# Patient Record
Sex: MALE | Race: WHITE | URBAN - METROPOLITAN AREA
[De-identification: names, ages, dates, MRNs, and addresses within clinical notes are randomized per-mention and may not be internally consistent; named-entity substitution may affect disease eponyms.]

---

## 2024-02-21 ENCOUNTER — OFFICE VISIT (OUTPATIENT)
Dept: URGENT CARE | Facility: CLINIC | Age: 54
End: 2024-02-21
Payer: COMMERCIAL

## 2024-02-21 VITALS
TEMPERATURE: 97.2 F | DIASTOLIC BLOOD PRESSURE: 81 MMHG | HEIGHT: 69 IN | OXYGEN SATURATION: 95 % | SYSTOLIC BLOOD PRESSURE: 129 MMHG | BODY MASS INDEX: 38.09 KG/M2 | RESPIRATION RATE: 16 BRPM | WEIGHT: 257.2 LBS | HEART RATE: 48 BPM

## 2024-02-21 DIAGNOSIS — B34.9 VIRAL SYNDROME: Primary | ICD-10-CM

## 2024-02-21 PROCEDURE — 99213 OFFICE O/P EST LOW 20 MIN: CPT | Performed by: NURSE PRACTITIONER

## 2024-02-21 RX ORDER — SPIRONOLACTONE 25 MG/1
25 TABLET ORAL DAILY
COMMUNITY
Start: 2023-12-12 | End: 2024-12-11

## 2024-02-21 RX ORDER — ATORVASTATIN CALCIUM 80 MG/1
1 TABLET, FILM COATED ORAL EVERY EVENING
COMMUNITY
Start: 2023-10-02

## 2024-02-21 RX ORDER — METOPROLOL SUCCINATE 100 MG/1
200 TABLET, EXTENDED RELEASE ORAL DAILY
COMMUNITY
Start: 2023-12-18

## 2024-02-21 RX ORDER — ASPIRIN 81 MG/1
81 TABLET ORAL DAILY
COMMUNITY

## 2024-02-21 NOTE — LETTER
February 21, 2024     Patient: Jaskaran Henderson   YOB: 1970   Date of Visit: 2/21/2024       To Whom it May Concern:    Jaskaran Henderson was seen in my clinic on 2/21/2024. He may return to work on 2/22/2024 .    If you have any questions or concerns, please don't hesitate to call.         Sincerely,          GILSON Rowell

## 2024-02-21 NOTE — PROGRESS NOTES
[facility]        NAME: Jaskaran Henderson is a 54 y.o. male  : 1970    MRN: [id number]  DATE: 2024  TIME: 12:45 PM    Assessment and Plan   Viral syndrome [B34.9]  1. Viral syndrome          Acute symptomatic symptoms resolved at this time will provide note for work follow-up with any worsening symptoms   Patient Instructions       Follow up with PCP in 3-5 days.  Proceed to  ER if symptoms worsen.    Chief Complaint     Chief Complaint   Patient presents with   • Cold Like Symptoms     Weakness, headache, and fatigued starting this AM. OTC meds - tylenol. Symptoms have resolved at this time.          History of Present Illness       Patient is a 54-year-old male arrives with starting today fatigue headache body aches cough started this morning.  Took some over-the-counter Tylenol with some relief.  Symptoms have resolved at this time needing a work note for today.        Review of Systems   Review of Systems   Constitutional:  Negative for activity change, appetite change, chills, fatigue and fever.   HENT:  Negative for congestion, rhinorrhea, sinus pressure, sinus pain and sore throat.    Respiratory:  Positive for cough. Negative for chest tightness and shortness of breath.    Gastrointestinal:  Negative for constipation, diarrhea, nausea and vomiting.   Musculoskeletal:  Positive for myalgias. Negative for joint swelling.   Skin:  Negative for color change, pallor and rash.   Neurological:  Positive for headaches. Negative for dizziness, syncope, weakness and light-headedness.   Hematological:  Negative for adenopathy.   Psychiatric/Behavioral:  Negative for agitation and confusion.          Current Medications       Current Outpatient Medications:   •  Acetaminophen 325 MG CAPS, Take by mouth, Disp: , Rfl:   •  aspirin (ECOTRIN LOW STRENGTH) 81 mg EC tablet, Take 81 mg by mouth daily, Disp: , Rfl:   •  atorvastatin (LIPITOR) 80 mg tablet, Take 1 tablet by mouth every evening, Disp: ,  "Rfl:   •  metoprolol succinate (TOPROL-XL) 100 mg 24 hr tablet, Take 200 mg by mouth daily, Disp: , Rfl:   •  sacubitril-valsartan (ENTRESTO)  MG TABS, Take 1 tablet by mouth, Disp: , Rfl:   •  spironolactone (ALDACTONE) 25 mg tablet, Take 25 mg by mouth daily, Disp: , Rfl:     Current Allergies     Allergies as of 02/21/2024   • (No Known Allergies)            The following portions of the patient's history were reviewed and updated as appropriate: allergies, current medications, past family history, past medical history, past social history, past surgical history and problem list.     Past Medical History:   Diagnosis Date   • High cholesterol    • Hypertension        Past Surgical History:   Procedure Laterality Date   • CORONARY ARTERY BYPASS GRAFT         History reviewed. No pertinent family history.      Medications have been verified.        Objective   /81   Pulse (!) 48   Temp (!) 97.2 °F (36.2 °C)   Resp 16   Ht 5' 9\" (1.753 m)   Wt 117 kg (257 lb 3.2 oz)   SpO2 95%   BMI 37.98 kg/m²   No LMP for male patient.       Physical Exam     Physical Exam  Vitals and nursing note reviewed.   Constitutional:       General: He is not in acute distress.     Appearance: Normal appearance. He is not ill-appearing, toxic-appearing or diaphoretic.   HENT:      Head: Normocephalic and atraumatic.      Right Ear: Tympanic membrane, ear canal and external ear normal. There is no impacted cerumen.      Left Ear: Tympanic membrane, ear canal and external ear normal. There is no impacted cerumen.      Nose: No congestion or rhinorrhea.      Mouth/Throat:      Mouth: Mucous membranes are moist.      Pharynx: No posterior oropharyngeal erythema.   Eyes:      General: No scleral icterus.        Right eye: No discharge.         Left eye: No discharge.      Conjunctiva/sclera: Conjunctivae normal.   Cardiovascular:      Rate and Rhythm: Normal rate and regular rhythm.   Pulmonary:      Effort: Pulmonary effort " is normal. No respiratory distress.      Breath sounds: Normal breath sounds. No stridor. No wheezing, rhonchi or rales.   Musculoskeletal:         General: Normal range of motion.      Cervical back: Normal range of motion.   Lymphadenopathy:      Cervical: No cervical adenopathy.   Skin:     General: Skin is dry.   Neurological:      Mental Status: He is alert and oriented to person, place, and time.   Psychiatric:         Mood and Affect: Mood normal.         Behavior: Behavior normal.         Thought Content: Thought content normal.         Judgment: Judgment normal.

## 2024-05-01 ENCOUNTER — OFFICE VISIT (OUTPATIENT)
Dept: URGENT CARE | Facility: CLINIC | Age: 54
End: 2024-05-01
Payer: COMMERCIAL

## 2024-05-01 VITALS
TEMPERATURE: 97.2 F | OXYGEN SATURATION: 98 % | HEIGHT: 69 IN | HEART RATE: 49 BPM | BODY MASS INDEX: 36.88 KG/M2 | WEIGHT: 249 LBS | SYSTOLIC BLOOD PRESSURE: 116 MMHG | DIASTOLIC BLOOD PRESSURE: 66 MMHG | RESPIRATION RATE: 14 BRPM

## 2024-05-01 DIAGNOSIS — J30.1 ALLERGIC RHINITIS DUE TO POLLEN, UNSPECIFIED SEASONALITY: ICD-10-CM

## 2024-05-01 DIAGNOSIS — J06.9 ACUTE URI: Primary | ICD-10-CM

## 2024-05-01 PROCEDURE — 99213 OFFICE O/P EST LOW 20 MIN: CPT | Performed by: PHYSICIAN ASSISTANT

## 2024-05-01 RX ORDER — PREDNISONE 10 MG/1
TABLET ORAL
Qty: 21 TABLET | Refills: 0 | Status: SHIPPED | OUTPATIENT
Start: 2024-05-01

## 2024-05-01 RX ORDER — NITROGLYCERIN 0.4 MG/1
0.4 TABLET SUBLINGUAL
COMMUNITY
Start: 2024-03-26 | End: 2024-07-24

## 2024-05-01 NOTE — PATIENT INSTRUCTIONS
Upper Respiratory Infection   WHAT YOU NEED TO KNOW:   An upper respiratory infection is also called a cold. It can affect your nose, throat, ears, and sinuses. Cold symptoms are usually worst for the first 3 to 5 days. Most people get better in 7 to 14 days. You may continue to cough for 2 to 3 weeks. Colds are caused by viruses and do not get better with antibiotics.  DISCHARGE INSTRUCTIONS:   Call your local emergency number (911 in the [place]) if:   You have chest pain or trouble breathing.      Return to the emergency department if:   You have a fever over 102ºF (39ºC).      Call your doctor if:   You have a low fever.    Your sore throat gets worse or you see white or yellow spots in your throat.    Your symptoms get worse after 3 to 5 days or are not better in 14 days.    You have a rash anywhere on your skin.    You have large, tender lumps in your neck.    You have thick, green, or yellow drainage from your nose.    You cough up thick yellow, green, or bloody mucus.    You have a bad earache.    You have questions or concerns about your condition or care.    Medicines:  You may need any of the following:  Decongestants  help reduce nasal congestion and help you breathe more easily. If you take decongestant pills, they may make you feel restless or cause problems with your sleep. Do not use decongestant sprays for more than a few days.    Cough suppressants  help reduce coughing. Ask your healthcare provider which type of cough medicine is best for you.     NSAIDs , such as ibuprofen, help decrease swelling, pain, and fever. NSAIDs can cause stomach bleeding or kidney problems in certain people. If you take blood thinner medicine, always ask your healthcare provider if NSAIDs are safe for you. Always read the medicine label and follow directions.    Acetaminophen  decreases pain and fever. It is available without a doctor's order. Ask how much to take and how often to take it. Follow directions. Read the labels  of all other medicines you are using to see if they also contain acetaminophen, or ask your doctor or pharmacist. Acetaminophen can cause liver damage if not taken correctly.    Take your medicine as directed.  Contact your healthcare provider if you think your medicine is not helping or if you have side effects. Tell your provider if you are allergic to any medicine. Keep a list of the medicines, vitamins, and herbs you take. Include the amounts, and when and why you take them. Bring the list or the pill bottles to follow-up visits. Carry your medicine list with you in case of an emergency.    Self-care:   Rest as much as possible.  Slowly start to do more each day.    Drink more liquids as directed.  Liquids will help thin and loosen mucus so you can cough it up. Liquids will also help prevent dehydration. Liquids that help prevent dehydration include water, fruit juice, and broth. Do not drink liquids that contain caffeine. Caffeine can increase your risk for dehydration. Ask your healthcare provider how much liquid to drink each day.    Soothe a sore throat.  Gargle with warm salt water. Make salt water by dissolving ¼ teaspoon salt in 1 cup warm water. You may also suck on hard candy or throat lozenges. You may use a sore throat spray.    Use a humidifier or vaporizer.  Use a cool mist humidifier or a vaporizer to increase air moisture in your home. This may make it easier for you to breathe and help decrease your cough.    Use saline nasal drops as directed.  These help relieve congestion.    Apply petroleum-based jelly around the outside of your nostrils.  This can decrease irritation from blowing your nose.    Do not smoke.  Nicotine and other chemicals in cigarettes and cigars can make your symptoms worse. They can also cause infections such as bronchitis or pneumonia. Ask your healthcare provider for information if you currently smoke and need help to quit. E-cigarettes or smokeless tobacco still contain  nicotine. Talk to your healthcare provider before you use these products.    Prevent a cold:   Wash your hands often.  Use soap and water every time you wash your hands. Rub your soapy hands together, lacing your fingers. Use the fingers of one hand to scrub under the nails of the other hand. Wash for at least 20 seconds. Rinse with warm, running water for several seconds. Then dry your hands. Use hand  gel if soap and water are not available. Do not touch your eyes or mouth without washing your hands first.         Cover a sneeze or cough.  Use a tissue that covers your mouth and nose. Put the used tissue in the trash right away. Use the bend of your arm if a tissue is not available. Wash your hands well with soap and water or use a hand . Do not stand close to anyone who is sneezing or coughing.    Try to stay away from others while you are sick.  This is especially important during the first 2 to 3 days when the virus is more easily spread. Wait until a fever, cough, or other symptoms are gone before you return to work or other regular activities.    Do not share items while you are sick.  This includes food, drinks, eating utensils, and dishes.    Follow up with your doctor as directed:  Write down your questions so you remember to ask them during your visits.  © Copyright [employer] 2023 Information is for End User's use only and may not be sold, redistributed or otherwise used for commercial purposes.  The above information is an  only. It is not intended as medical advice for individual conditions or treatments. Talk to your doctor, nurse or pharmacist before following any medical regimen to see if it is safe and effective for you.      Allergic Rhinitis   WHAT YOU NEED TO KNOW:   Allergic rhinitis, or hay fever, is swelling of the inside of your nose. The swelling is a reaction to allergens in the air. An allergen can be anything that causes an allergic reaction. Allergies to  weeds, grass, trees, or mold often cause seasonal allergic rhinitis. Indoor dust mites, cockroaches, pet dander, or mold can also cause allergic rhinitis.   DISCHARGE INSTRUCTIONS:   Call 911 for the following:   You have chest pain or shortness of breath.       Return to the emergency department if:   You have severe pain.    You cough up blood.    Contact your healthcare provider if:   You have a fever.     You have ear or sinus pain, or a headache.    Your symptoms get worse, even after treatment.     You have yellow, green, brown, or bloody mucus coming from your nose.     Your nose is bleeding or you have pain inside your nose.     You have trouble sleeping because of your symptoms.    You have questions or concerns about your condition or care.    Medicines:   Medicines  help decrease your symptoms and clear your stuffy nose.    Take your medicine as directed.  Contact your healthcare provider if you think your medicine is not helping or if you have side effects. Tell him of her if you are allergic to any medicine. Keep a list of the medicines, vitamins, and herbs you take. Include the amounts, and when and why you take them. Bring the list or the pill bottles to follow-up visits. Carry your medicine list with you in case of an emergency.    How to manage allergic rhinitis:  The best way to manage allergic rhinitis is to avoid allergens that can trigger your symptoms. Any of the following may help decrease your symptoms:  Rinse your nose and sinuses  with a salt water solution or use a salt water nasal spray. This will help thin the mucus in your nose and rinse away pollen and dirt. It will also help reduce swelling so you can breathe normally. Ask your healthcare provider how often to rinse your nose.    Reduce exposure to dust mites.  Wash sheets and towels in hot water every week. Cover your pillows and mattresses with allergen-free covers. Limit the number of stuffed animals and soft toys your child has.  Wash your child's toys in hot water regularly. Vacuum weekly and use a vacuum  with an air filter. If possible, get rid of carpets and curtains. These collect dust and dust mites.     Reduce exposure to pollen.  Keep windows and doors closed in your house and car. Stay inside when air pollution or the pollen count is high. Run your air conditioner on recycle, and change air filters often. Shower and wash your hair before bed every night to rinse away pollen.    Reduce exposure to pet dander.  If possible, do not keep cats, dogs, birds, or other pets. If you do keep pets in your home, keep them out of bedrooms and carpeted rooms. Bathe them often.     Reduce exposure to mold.  Do not spend time in basements. Choose artificial plants instead of live plants. Keep your home's humidity at less than 45%. Do not have ponds or standing water in your home or yard.     Do not smoke.  Avoid others who smoke. Ask your healthcare provider for information if you currently smoke and need help to quit.    Follow up with your healthcare provider as directed:  You may need to see an allergist often to control your symptoms. Write down your questions so you remember to ask them during your visits.  © Copyright [employer] 2022 Information is for End User's use only and may not be sold, redistributed or otherwise used for commercial purposes. All illustrations and images included in CareNotes® are the copyrighted property of [employer] or [employer]  The above information is an  only. It is not intended as medical advice for individual conditions or treatments. Talk to your doctor, nurse or pharmacist before following any medical regimen to see if it is safe and effective for you.

## 2024-05-01 NOTE — LETTER
May 1, 2024     Patient: Jaskaran Henderson   YOB: 1970   Date of Visit: 5/1/2024       To Whom it May Concern:    Jaskaran Henderson was seen in my clinic on 5/1/2024. He may return to work on 5/2/2024 .    If you have any questions or concerns, please don't hesitate to call.         Sincerely,          Thony Biggs PA-C        CC: No Recipients

## 2024-05-01 NOTE — PROGRESS NOTES
[facility]        NAME: Jaskaran Henderson is a 54 y.o. male  : 1970    MRN: [id number]  DATE: May 1, 2024  TIME: 9:09 AM    Assessment and Plan   Acute URI [J06.9]  1. Acute URI  predniSONE 10 mg tablet      2. Allergic rhinitis due to pollen, unspecified seasonality  predniSONE 10 mg tablet            Patient Instructions     Pt has acute onset of sinus congestion likely from an acute URI vs seasonal allergies. I prescribed him an oral Prednisone taper and discussed OTC symptomatic/supportive measures.   Follow up with PCP in 3-5 days.  Proceed to  ER if symptoms worsen.    If tests have been performed at [facility], our office will contact you with results if changes need to be made to the care plan discussed with you at the visit.  [facility]    Chief Complaint     Chief Complaint   Patient presents with    Cold Like Symptoms     Sinus congestion starting this am.          History of Present Illness       Pt presents with onset this AM of sinus congestion as primary symptom. He denies ST, cough, fever, chills, NVD. He has hx of allergies. Not taking anything significantly to this point for the symptoms.         Review of Systems   Review of Systems   Constitutional: Negative.    HENT:  Positive for congestion. Negative for sore throat.    Respiratory: Negative.     Cardiovascular: Negative.    Gastrointestinal: Negative.    Genitourinary: Negative.          Current Medications       Current Outpatient Medications:     Acetaminophen 325 MG CAPS, Take by mouth, Disp: , Rfl:     aspirin (ECOTRIN LOW STRENGTH) 81 mg EC tablet, Take 81 mg by mouth daily, Disp: , Rfl:     atorvastatin (LIPITOR) 80 mg tablet, Take 1 tablet by mouth every evening, Disp: , Rfl:     metoprolol succinate (TOPROL-XL) 100 mg 24 hr tablet, Take 200 mg by mouth daily, Disp: , Rfl:     predniSONE 10 mg tablet, 6-5-4-3-2-1 taper with food., Disp: 21 tablet, Rfl: 0    sacubitril-valsartan  "(ENTRESTO)  MG TABS, Take 1 tablet by mouth, Disp: , Rfl:     spironolactone (ALDACTONE) 25 mg tablet, Take 25 mg by mouth daily, Disp: , Rfl:     nitroglycerin (NITROSTAT) 0.4 mg SL tablet, Place 0.4 mg under the tongue (Patient not taking: Reported on 5/1/2024), Disp: , Rfl:     Current Allergies     Allergies as of 05/01/2024    (No Known Allergies)            The following portions of the patient's history were reviewed and updated as appropriate: allergies, current medications, past family history, past medical history, past social history, past surgical history and problem list.     Past Medical History:   Diagnosis Date    High cholesterol     Hypertension        Past Surgical History:   Procedure Laterality Date    CORONARY ARTERY BYPASS GRAFT         Family History   Problem Relation Age of Onset    Heart disease Father          Medications have been verified.        Objective   /66   Pulse (!) 49   Temp (!) 97.2 °F (36.2 °C)   Resp 14   Ht 5' 9\" (1.753 m)   Wt 113 kg (249 lb)   SpO2 98%   BMI 36.77 kg/m²   No LMP for male patient.       Physical Exam     Physical Exam  Vitals reviewed.   Constitutional:       General: He is not in acute distress.     Appearance: He is well-developed.   HENT:      Right Ear: Tympanic membrane, ear canal and external ear normal.      Left Ear: Tympanic membrane, ear canal and external ear normal.      Nose: Mucosal edema (B/L boggy turbinates) and congestion present.      Mouth/Throat:      Mouth: Mucous membranes are moist.      Pharynx: Oropharynx is clear.   Cardiovascular:      Rate and Rhythm: Normal rate and regular rhythm.      Heart sounds: Normal heart sounds. No murmur heard.  Pulmonary:      Effort: Pulmonary effort is normal. No respiratory distress.      Breath sounds: Normal breath sounds.   Musculoskeletal:      Cervical back: Neck supple.   Lymphadenopathy:      Cervical: No cervical adenopathy.   Neurological:      Mental Status: He is " alert and oriented to person, place, and time.

## 2025-02-17 ENCOUNTER — OFFICE VISIT (OUTPATIENT)
Dept: URGENT CARE | Facility: CLINIC | Age: 55
End: 2025-02-17
Payer: COMMERCIAL

## 2025-02-17 VITALS
RESPIRATION RATE: 12 BRPM | WEIGHT: 259.2 LBS | DIASTOLIC BLOOD PRESSURE: 85 MMHG | HEART RATE: 60 BPM | OXYGEN SATURATION: 99 % | BODY MASS INDEX: 38.39 KG/M2 | HEIGHT: 69 IN | SYSTOLIC BLOOD PRESSURE: 135 MMHG | TEMPERATURE: 96.1 F

## 2025-02-17 DIAGNOSIS — J06.9 VIRAL URI WITH COUGH: Primary | ICD-10-CM

## 2025-02-17 PROCEDURE — G0381 LEV 2 HOSP TYPE B ED VISIT: HCPCS

## 2025-02-17 PROCEDURE — S9083 URGENT CARE CENTER GLOBAL: HCPCS

## 2025-02-17 RX ORDER — BENZONATATE 200 MG/1
200 CAPSULE ORAL 3 TIMES DAILY PRN
Qty: 20 CAPSULE | Refills: 0 | Status: SHIPPED | OUTPATIENT
Start: 2025-02-17

## 2025-02-17 RX ORDER — AMOXICILLIN 500 MG/1
CAPSULE ORAL
COMMUNITY
Start: 2024-11-14

## 2025-02-17 NOTE — PATIENT INSTRUCTIONS
Thank you for trusting your health with [facility].    Please review the following instructions and return to our clinic or consider an Emergency Department visit for worsening or severe symptoms.      Instructions:   Utilize saline nasal spray OTC STRAIGHT down each nostril as often as needed  Utilize Flonase nasal spray with steroids OTC ANGLED towards your sinuses 2 times a day as needed  Maintain Tylenol every 6 hours as needed, do not exceed six, 500 mg doses in a 24 hour time period for fever, aches, and chills  Mucinex, blue box, consistently as written on the box can help break up mucus well  Hydrate, hydrate, hydrate  If symptoms worsen or do not start resolving with the week, please contact PCP or consider another visit with our team

## 2025-02-17 NOTE — LETTER
February 17, 2025     Patient: Jaskaran Henderson   YOB: 1970   Date of Visit: 2/17/2025       To Whom it May Concern:    Jaskaran Henderson was seen in my clinic on 2/17/2025. He may return to work on 2/18/25 .    If you have any questions or concerns, please don't hesitate to call.         Sincerely,          Sasha Dominguez PA-C        CC: No Recipients

## 2025-02-17 NOTE — PROGRESS NOTES
"Name: Jaskaran Henderson      : 1970      MRN: [id number]  Encounter Provider: Sasha Dominguez PA-C  Encounter Date: 2025   Encounter department: [facility]  :  Assessment & Plan  Viral URI with cough    Orders:    benzonatate (TESSALON) 200 MG capsule; Take 1 capsule (200 mg total) by mouth 3 (three) times a day as needed for cough    Tessalon pearls sent for bothersome cough from viral URI. Reviewed OTC mucus management as below:  Instructions:   Utilize saline nasal spray OTC STRAIGHT down each nostril as often as needed  Utilize Flonase nasal spray with steroids OTC ANGLED towards your sinuses 2 times a day as needed  Maintain Tylenol every 6 hours as needed, do not exceed six, 500 mg doses in a 24 hour time period for fever, aches, and chills  Mucinex, blue box, consistently as written on the box can help break up mucus well  Hydrate, hydrate, hydrate  If symptoms worsen or do not start resolving with the week, please contact PCP or consider another visit with our team    Pt knows return precautions.      History of Present Illness   HPI  Jaskaran Henderson is a 55 y.o. male who presents with cough and headache starting this am       Review of Systems   Constitutional:  Negative for fever.   HENT:  Positive for congestion.    Respiratory:  Positive for cough.    Neurological:  Positive for headaches.              Objective   /85   Pulse 60   Temp (!) 96.1 °F (35.6 °C)   Resp 12   Ht 5' 9\" (1.753 m)   Wt 118 kg (259 lb 3.2 oz)   SpO2 99%   BMI 38.28 kg/m²      Physical Exam  Constitutional:       Appearance: Normal appearance.   HENT:      Head: Normocephalic and atraumatic.      Right Ear: Tympanic membrane and ear canal normal.      Left Ear: Tympanic membrane and ear canal normal.      Nose: Congestion present.      Mouth/Throat:      Mouth: Mucous membranes are moist.   Cardiovascular:      Rate and Rhythm: Normal rate.   Pulmonary:      Effort: Pulmonary effort is normal. "      Breath sounds: Normal breath sounds.   Neurological:      Mental Status: He is alert.

## 2025-03-28 ENCOUNTER — OFFICE VISIT (OUTPATIENT)
Dept: URGENT CARE | Facility: CLINIC | Age: 55
End: 2025-03-28
Payer: COMMERCIAL

## 2025-03-28 VITALS
HEART RATE: 80 BPM | BODY MASS INDEX: 37.07 KG/M2 | SYSTOLIC BLOOD PRESSURE: 114 MMHG | WEIGHT: 251 LBS | OXYGEN SATURATION: 98 % | TEMPERATURE: 99.1 F | RESPIRATION RATE: 18 BRPM | DIASTOLIC BLOOD PRESSURE: 59 MMHG

## 2025-03-28 DIAGNOSIS — B34.9 VIRAL SYNDROME: ICD-10-CM

## 2025-03-28 DIAGNOSIS — M79.10 MYALGIA: Primary | ICD-10-CM

## 2025-03-28 LAB
GLUCOSE SERPL-MCNC: 129 MG/DL (ref 65–140)
SL AMB  POCT GLUCOSE, UA: ABNORMAL
SL AMB LEUKOCYTE ESTERASE,UA: ABNORMAL
SL AMB POCT BILIRUBIN,UA: ABNORMAL
SL AMB POCT BLOOD,UA: ABNORMAL
SL AMB POCT CLARITY,UA: ABNORMAL
SL AMB POCT COLOR,UA: YELLOW
SL AMB POCT KETONES,UA: ABNORMAL
SL AMB POCT NITRITE,UA: ABNORMAL
SL AMB POCT PH,UA: 5
SL AMB POCT SPECIFIC GRAVITY,UA: 1.01
SL AMB POCT URINE PROTEIN: 30
SL AMB POCT UROBILINOGEN: 0.2

## 2025-03-28 PROCEDURE — G0382 LEV 3 HOSP TYPE B ED VISIT: HCPCS | Performed by: PHYSICIAN ASSISTANT

## 2025-03-28 PROCEDURE — 81002 URINALYSIS NONAUTO W/O SCOPE: CPT | Performed by: PHYSICIAN ASSISTANT

## 2025-03-28 PROCEDURE — 82948 REAGENT STRIP/BLOOD GLUCOSE: CPT | Performed by: PHYSICIAN ASSISTANT

## 2025-03-28 PROCEDURE — 87636 SARSCOV2 & INF A&B AMP PRB: CPT | Performed by: PHYSICIAN ASSISTANT

## 2025-03-28 PROCEDURE — S9083 URGENT CARE CENTER GLOBAL: HCPCS | Performed by: PHYSICIAN ASSISTANT

## 2025-03-28 NOTE — LETTER
March 28, 2025     Patient: Jaskaran Henderson   YOB: 1970   Date of Visit: 3/28/2025       To Whom It May Concern:    It is my medical opinion that Jaskaran Henderson may return to work on 3/31/2025 .    If you have any questions or concerns, please don't hesitate to call.         Sincerely,        Farshad Hudson Jr, CARMELLA    CC: No Recipients

## 2025-03-28 NOTE — PROGRESS NOTES
[facility]        NAME: Jaskaran Henderson is a 55 y.o. male  : 1970    MRN: [id number]  DATE: 2025  TIME: 12:25 PM    /59   Pulse 80   Temp 99.1 °F (37.3 °C)   Resp 18   Wt 114 kg (251 lb)   SpO2 98%   BMI 37.07 kg/m²     Assessment and Plan   Myalgia [M79.10]  1. Myalgia  POCT urine dip    Covid/Flu- Office Collect Normal    Covid/Flu- Office Collect Normal      2. Viral syndrome              Patient Instructions       Follow up with PCP in 3-5 days.  Proceed to  ER if symptoms worsen.    Chief Complaint     Chief Complaint   Patient presents with    Generalized Body Aches    Fatigue     Sx started on Wednesday. Temp of 99 yesterday. No cough or other SX. No chest pain or SOB. Just feels achy. Taking Dayquil. Feels better today.          History of Present Illness       Pt with 2-3  days body aches and fatigue  no cough no fevers no sob no dizziness no lightheadedness      Generalized Body Aches  Associated symptoms include fatigue.   Fatigue  Associated symptoms include fatigue and myalgias.       Review of Systems   Review of Systems   Constitutional:  Positive for fatigue.   HENT: Negative.     Eyes: Negative.    Respiratory: Negative.     Cardiovascular: Negative.    Gastrointestinal: Negative.    Endocrine: Negative.    Genitourinary: Negative.    Musculoskeletal:  Positive for myalgias.   Skin: Negative.    Allergic/Immunologic: Negative.    Neurological: Negative.    Hematological: Negative.    Psychiatric/Behavioral: Negative.     All other systems reviewed and are negative.        Current Medications       Current Outpatient Medications:     Acetaminophen 325 MG CAPS, Take by mouth if needed, Disp: , Rfl:     aspirin (ECOTRIN LOW STRENGTH) 81 mg EC tablet, Take 81 mg by mouth daily, Disp: , Rfl:     atorvastatin (LIPITOR) 80 mg tablet, Take 1 tablet by mouth every evening, Disp: , Rfl:     Empagliflozin (JARDIANCE) 10 MG TABS tablet, Take 10 mg by mouth daily, Disp: , Rfl:      metoprolol succinate (TOPROL-XL) 100 mg 24 hr tablet, Take 200 mg by mouth daily, Disp: , Rfl:     sacubitril-valsartan (ENTRESTO)  MG TABS, Take 1 tablet by mouth, Disp: , Rfl:     amoxicillin (AMOXIL) 500 mg capsule, take 1 capsule by mouth 3 times daily for 5 days. (Patient not taking: Reported on 2/17/2025), Disp: , Rfl:     benzonatate (TESSALON) 200 MG capsule, Take 1 capsule (200 mg total) by mouth 3 (three) times a day as needed for cough (Patient not taking: Reported on 3/28/2025), Disp: 20 capsule, Rfl: 0    nitroglycerin (NITROSTAT) 0.4 mg SL tablet, Place 0.4 mg under the tongue (Patient not taking: Reported on 5/1/2024), Disp: , Rfl:     predniSONE 10 mg tablet, 6-5-4-3-2-1 taper with food. (Patient not taking: Reported on 3/28/2025), Disp: 21 tablet, Rfl: 0    spironolactone (ALDACTONE) 25 mg tablet, Take 25 mg by mouth daily, Disp: , Rfl:     Current Allergies     Allergies as of 03/28/2025    (No Known Allergies)            The following portions of the patient's history were reviewed and updated as appropriate: allergies, current medications, past family history, past medical history, past social history, past surgical history and problem list.     Past Medical History:   Diagnosis Date    Chronic systolic congestive heart failure (HCC)     High cholesterol     Hypertension        Past Surgical History:   Procedure Laterality Date    CARDIAC DEFIBRILLATOR PLACEMENT      ICD placement    CORONARY ARTERY BYPASS GRAFT         Family History   Problem Relation Age of Onset    Heart disease Father          Medications have been verified.        Objective   /59   Pulse 80   Temp 99.1 °F (37.3 °C)   Resp 18   Wt 114 kg (251 lb)   SpO2 98%   BMI 37.07 kg/m²        Physical Exam     Physical Exam  Vitals and nursing note reviewed.   Constitutional:       Appearance: Normal appearance. He is normal weight.      Comments: No sob with walkng  no dizziness with standing      Finger glucose  129    Urine dip   +glucose and only trace blood  so no rhabdo   HENT:      Head: Normocephalic and atraumatic.      Right Ear: Tympanic membrane, ear canal and external ear normal.      Left Ear: Tympanic membrane, ear canal and external ear normal.      Nose: Nose normal.      Mouth/Throat:      Mouth: Mucous membranes are moist.      Pharynx: Oropharynx is clear.   Eyes:      Extraocular Movements: Extraocular movements intact.      Conjunctiva/sclera: Conjunctivae normal.      Pupils: Pupils are equal, round, and reactive to light.   Cardiovascular:      Rate and Rhythm: Normal rate and regular rhythm.      Pulses: Normal pulses.      Heart sounds: Normal heart sounds.   Pulmonary:      Effort: Pulmonary effort is normal.      Breath sounds: Normal breath sounds.   Abdominal:      General: Bowel sounds are normal.      Palpations: Abdomen is soft.   Musculoskeletal:         General: Normal range of motion.      Cervical back: Normal range of motion and neck supple.   Skin:     General: Skin is warm.      Capillary Refill: Capillary refill takes less than 2 seconds.   Neurological:      Mental Status: He is alert and oriented to person, place, and time.   Psychiatric:         Behavior: Behavior normal.

## 2025-03-29 LAB
FLUAV RNA RESP QL NAA+PROBE: NEGATIVE
FLUBV RNA RESP QL NAA+PROBE: NEGATIVE
SARS-COV-2 RNA RESP QL NAA+PROBE: NEGATIVE